# Patient Record
Sex: FEMALE | Employment: OTHER | URBAN - METROPOLITAN AREA
[De-identification: names, ages, dates, MRNs, and addresses within clinical notes are randomized per-mention and may not be internally consistent; named-entity substitution may affect disease eponyms.]

---

## 2019-03-05 ENCOUNTER — APPOINTMENT (OUTPATIENT)
Dept: CT IMAGING | Facility: HOSPITAL | Age: 84
DRG: 378 | End: 2019-03-05
Attending: EMERGENCY MEDICINE
Payer: MEDICARE

## 2019-03-05 ENCOUNTER — APPOINTMENT (OUTPATIENT)
Dept: GENERAL RADIOLOGY | Facility: HOSPITAL | Age: 84
DRG: 378 | End: 2019-03-05
Attending: EMERGENCY MEDICINE
Payer: MEDICARE

## 2019-03-05 ENCOUNTER — HOSPITAL ENCOUNTER (INPATIENT)
Facility: HOSPITAL | Age: 84
LOS: 2 days | Discharge: NURSING, REHAB OR LONG-TERM CARE | DRG: 378 | End: 2019-03-07
Attending: EMERGENCY MEDICINE | Admitting: HOSPITALIST
Payer: MEDICARE

## 2019-03-05 DIAGNOSIS — R25.1 EPISODE OF SHAKING: Primary | ICD-10-CM

## 2019-03-05 DIAGNOSIS — D64.9 ANEMIA, UNSPECIFIED TYPE: ICD-10-CM

## 2019-03-05 DIAGNOSIS — K92.2 GASTROINTESTINAL HEMORRHAGE, UNSPECIFIED GASTROINTESTINAL HEMORRHAGE TYPE: ICD-10-CM

## 2019-03-05 LAB
ALBUMIN SERPL-MCNC: 2.6 G/DL (ref 3.4–5)
ALBUMIN/GLOB SERPL: 0.9 {RATIO} (ref 1–2)
ALP LIVER SERPL-CCNC: 54 U/L (ref 55–142)
ALT SERPL-CCNC: 7 U/L (ref 13–56)
ANION GAP SERPL CALC-SCNC: 8 MMOL/L (ref 0–18)
ANTIBODY SCREEN: POSITIVE
APTT PPP: 21.2 SECONDS (ref 26.1–34.6)
AST SERPL-CCNC: 8 U/L (ref 15–37)
ATRIAL RATE: 111 BPM
BASOPHILS # BLD AUTO: 0.02 X10(3) UL (ref 0–0.2)
BASOPHILS NFR BLD AUTO: 0.1 %
BILIRUB SERPL-MCNC: 0.3 MG/DL (ref 0.1–2)
BUN BLD-MCNC: 25 MG/DL (ref 7–18)
BUN/CREAT SERPL: 34.2 (ref 10–20)
CALCIUM BLD-MCNC: 8.4 MG/DL (ref 8.5–10.1)
CHLORIDE SERPL-SCNC: 112 MMOL/L (ref 98–107)
CO2 SERPL-SCNC: 22 MMOL/L (ref 21–32)
CREAT BLD-MCNC: 0.73 MG/DL (ref 0.55–1.02)
DEPRECATED RDW RBC AUTO: 46.8 FL (ref 35.1–46.3)
DEPRECATED RDW RBC AUTO: 48.5 FL (ref 35.1–46.3)
E ANTIGEN: NEGATIVE
EOSINOPHIL # BLD AUTO: 0.03 X10(3) UL (ref 0–0.7)
EOSINOPHIL NFR BLD AUTO: 0.2 %
ERYTHROCYTE [DISTWIDTH] IN BLOOD BY AUTOMATED COUNT: 15 % (ref 11–15)
ERYTHROCYTE [DISTWIDTH] IN BLOOD BY AUTOMATED COUNT: 15.2 % (ref 11–15)
GLOBULIN PLAS-MCNC: 3 G/DL (ref 2.8–4.4)
GLUCOSE BLD-MCNC: 121 MG/DL (ref 70–99)
HCT VFR BLD AUTO: 14.3 % (ref 35–48)
HCT VFR BLD AUTO: 16 % (ref 35–48)
HGB BLD-MCNC: 4.5 G/DL (ref 12–16)
HGB BLD-MCNC: 4.9 G/DL (ref 12–16)
IMM GRANULOCYTES # BLD AUTO: 0.09 X10(3) UL (ref 0–1)
IMM GRANULOCYTES NFR BLD: 0.6 %
INR BLD: 1.02 (ref 0.9–1.1)
LYMPHOCYTES # BLD AUTO: 2.41 X10(3) UL (ref 1–4)
LYMPHOCYTES NFR BLD AUTO: 15.9 %
M PROTEIN MFR SERPL ELPH: 5.6 G/DL (ref 6.4–8.2)
MCH RBC QN AUTO: 26.9 PG (ref 26–34)
MCH RBC QN AUTO: 27.3 PG (ref 26–34)
MCHC RBC AUTO-ENTMCNC: 30.6 G/DL (ref 31–37)
MCHC RBC AUTO-ENTMCNC: 31.5 G/DL (ref 31–37)
MCV RBC AUTO: 86.7 FL (ref 80–100)
MCV RBC AUTO: 87.9 FL (ref 80–100)
MONOCYTES # BLD AUTO: 1.17 X10(3) UL (ref 0.1–1)
MONOCYTES NFR BLD AUTO: 7.7 %
NEUTROPHILS # BLD AUTO: 11.43 X10 (3) UL (ref 1.5–7.7)
NEUTROPHILS # BLD AUTO: 11.43 X10(3) UL (ref 1.5–7.7)
NEUTROPHILS NFR BLD AUTO: 75.5 %
OSMOLALITY SERPL CALC.SUM OF ELEC: 300 MOSM/KG (ref 275–295)
P AXIS: 51 DEGREES
P-R INTERVAL: 150 MS
PLATELET # BLD AUTO: 634 10(3)UL (ref 150–450)
PLATELET # BLD AUTO: 706 10(3)UL (ref 150–450)
POTASSIUM SERPL-SCNC: 4.3 MMOL/L (ref 3.5–5.1)
PSA SERPL DL<=0.01 NG/ML-MCNC: 13.8 SECONDS (ref 12.5–14.7)
Q-T INTERVAL: 328 MS
QRS DURATION: 70 MS
QTC CALCULATION (BEZET): 443 MS
R AXIS: 14 DEGREES
RBC # BLD AUTO: 1.65 X10(6)UL (ref 3.8–5.3)
RBC # BLD AUTO: 1.82 X10(6)UL (ref 3.8–5.3)
RH BLOOD TYPE: NEGATIVE
SODIUM SERPL-SCNC: 142 MMOL/L (ref 136–145)
T AXIS: 189 DEGREES
TROPONIN I SERPL-MCNC: <0.045 NG/ML (ref ?–0.04)
VENTRICULAR RATE: 110 BPM
WBC # BLD AUTO: 12.6 X10(3) UL (ref 4–11)
WBC # BLD AUTO: 15.2 X10(3) UL (ref 4–11)

## 2019-03-05 PROCEDURE — 99223 1ST HOSP IP/OBS HIGH 75: CPT | Performed by: HOSPITALIST

## 2019-03-05 PROCEDURE — 71045 X-RAY EXAM CHEST 1 VIEW: CPT | Performed by: EMERGENCY MEDICINE

## 2019-03-05 PROCEDURE — 30233N1 TRANSFUSION OF NONAUTOLOGOUS RED BLOOD CELLS INTO PERIPHERAL VEIN, PERCUTANEOUS APPROACH: ICD-10-PCS | Performed by: HOSPITALIST

## 2019-03-05 PROCEDURE — 70450 CT HEAD/BRAIN W/O DYE: CPT | Performed by: EMERGENCY MEDICINE

## 2019-03-05 RX ORDER — FAMOTIDINE 20 MG/1
20 TABLET ORAL 2 TIMES DAILY
Status: DISCONTINUED | OUTPATIENT
Start: 2019-03-05 | End: 2019-03-05

## 2019-03-05 RX ORDER — TRAMADOL HYDROCHLORIDE 50 MG/1
50 TABLET ORAL EVERY 4 HOURS PRN
Status: DISCONTINUED | OUTPATIENT
Start: 2019-03-05 | End: 2019-03-07

## 2019-03-05 RX ORDER — METOCLOPRAMIDE HYDROCHLORIDE 5 MG/ML
10 INJECTION INTRAMUSCULAR; INTRAVENOUS EVERY 8 HOURS PRN
Status: DISCONTINUED | OUTPATIENT
Start: 2019-03-05 | End: 2019-03-07

## 2019-03-05 RX ORDER — FAMOTIDINE 20 MG/1
20 TABLET ORAL 2 TIMES DAILY
COMMUNITY

## 2019-03-05 RX ORDER — ONDANSETRON 2 MG/ML
4 INJECTION INTRAMUSCULAR; INTRAVENOUS EVERY 6 HOURS PRN
Status: DISCONTINUED | OUTPATIENT
Start: 2019-03-05 | End: 2019-03-07

## 2019-03-05 RX ORDER — SODIUM CHLORIDE 9 MG/ML
INJECTION, SOLUTION INTRAVENOUS ONCE
Status: COMPLETED | OUTPATIENT
Start: 2019-03-05 | End: 2019-03-05

## 2019-03-05 RX ORDER — ZOLPIDEM TARTRATE 5 MG/1
5 TABLET ORAL NIGHTLY PRN
Status: DISCONTINUED | OUTPATIENT
Start: 2019-03-05 | End: 2019-03-07

## 2019-03-05 RX ORDER — ONDANSETRON 2 MG/ML
4 INJECTION INTRAMUSCULAR; INTRAVENOUS EVERY 4 HOURS PRN
Status: DISCONTINUED | OUTPATIENT
Start: 2019-03-05 | End: 2019-03-05 | Stop reason: DRUGHIGH

## 2019-03-05 RX ORDER — ACETAMINOPHEN 325 MG/1
650 TABLET ORAL EVERY 6 HOURS PRN
Status: DISCONTINUED | OUTPATIENT
Start: 2019-03-05 | End: 2019-03-07

## 2019-03-05 RX ORDER — ONDANSETRON 2 MG/ML
4 INJECTION INTRAMUSCULAR; INTRAVENOUS EVERY 4 HOURS PRN
Status: DISCONTINUED | OUTPATIENT
Start: 2019-03-05 | End: 2019-03-05

## 2019-03-05 RX ORDER — SODIUM CHLORIDE 9 MG/ML
INJECTION, SOLUTION INTRAVENOUS CONTINUOUS
Status: DISCONTINUED | OUTPATIENT
Start: 2019-03-05 | End: 2019-03-07

## 2019-03-05 NOTE — ED PROVIDER NOTES
Patient Seen in: [facility] Emergency Department    History   Patient presents with:  Seizure Disorder (neurologic)    Stated Complaint: seizure    HPI    Patient is an 61-year-old female nursing home resident, with multiple past medical problems, p Exam    Vital signs noted. GENERAL: Patient is awake and alert, supine on the cart, in no apparent distress. HEENT: Head is without evidence of trauma. Extraocular muscles are intact. Pupils are equal and reactive to light.  Oropharynx is pink and moist limits   TROPONIN I - Normal   PROTHROMBIN TIME (PT) - Normal   CBC WITH DIFFERENTIAL WITH PLATELET    Narrative: The following orders were created for panel order CBC WITH DIFFERENTIAL WITH PLATELET.   Procedure                               Abnormalit \"shakin\" fo 1-2 minutes. Patient denies any complaints. History of CVA and Parkinsons. No seizure history. FINDINGS:  There is cerebral atrophy with symmetric prominence of the ventricles.  There are patchy areas of low attenuation in the periventric 1 unit of packed red blood source was ordered for transfusion. Admission for further evaluation was recommended. Patient agrees. Patient will be admitted to the service of the [facility]. Dr. Harrell Schaumann notified.   Gastroenterology to follow in

## 2019-03-05 NOTE — ED INITIAL ASSESSMENT (HPI)
Pt from [facility] here after she was eating lunch and someone witnessed her \"shaking\" for about 1-2 minutes. No seizure history.   History of parkinsons and CVA

## 2019-03-05 NOTE — H&P
[facility]PITAL[facility]  History and Physical     Marlon Lay Patient Status:  Emergency    1935 MRN [id number]   Location [address] Attending No att. providers found   Hosp Day # 0 PCP No primary care provider on file. differently: Take 50 mg by mouth every 4 (four) hours as needed.  ) Disp: 10 tablet Rfl: 0   Zolpidem Tartrate 5 MG Oral Tab Take 5 mg by mouth nightly as needed for Sleep.  Disp:  Rfl:    carbidopa-levodopa  MG Oral Tab Take 1.5 tablets by mouth 3 (t Lab  03/05/19   1503   PTP  13.8   INR  1.02       Recent Labs   Lab  03/05/19   1503   TROP  <0.045       Imaging: Imaging data reviewed in Epic. ASSESSMENT / PLAN:     1. Acute GI bleed  2. Acute blood loss anemia  3. Leukocytosis - reactive?   4.

## 2019-03-05 NOTE — ED NOTES
Pt presents with L side contractures. Her neck also appears contracted. She is alert and talking to staff,  Appears oriented in conversation. Able to answer questions regarding meds and daily activities. Does not appear in post ictal state.

## 2019-03-06 LAB
ANION GAP SERPL CALC-SCNC: 7 MMOL/L (ref 0–18)
BASOPHILS # BLD AUTO: 0.04 X10(3) UL (ref 0–0.2)
BASOPHILS NFR BLD AUTO: 0.4 %
BUN BLD-MCNC: 13 MG/DL (ref 7–18)
BUN/CREAT SERPL: 19.7 (ref 10–20)
CALCIUM BLD-MCNC: 7.5 MG/DL (ref 8.5–10.1)
CHLORIDE SERPL-SCNC: 119 MMOL/L (ref 98–107)
CO2 SERPL-SCNC: 20 MMOL/L (ref 21–32)
CREAT BLD-MCNC: 0.66 MG/DL (ref 0.55–1.02)
DEPRECATED HBV CORE AB SER IA-ACNC: 5.2 NG/ML (ref 18–340)
DEPRECATED RDW RBC AUTO: 45.5 FL (ref 35.1–46.3)
EOSINOPHIL # BLD AUTO: 0.1 X10(3) UL (ref 0–0.7)
EOSINOPHIL NFR BLD AUTO: 1 %
ERYTHROCYTE [DISTWIDTH] IN BLOOD BY AUTOMATED COUNT: 14.3 % (ref 11–15)
GLUCOSE BLD-MCNC: 86 MG/DL (ref 70–99)
HCT VFR BLD AUTO: 24.5 % (ref 35–48)
HGB BLD-MCNC: 5.9 G/DL (ref 12–16)
HGB BLD-MCNC: 7.6 G/DL (ref 12–16)
HGB BLD-MCNC: 7.7 G/DL (ref 12–16)
HGB BLD-MCNC: 8.1 G/DL (ref 12–16)
IMM GRANULOCYTES # BLD AUTO: 0.06 X10(3) UL (ref 0–1)
IMM GRANULOCYTES NFR BLD: 0.6 %
IRON SATURATION: 5 % (ref 15–50)
IRON SERPL-MCNC: 18 UG/DL (ref 50–170)
LYMPHOCYTES # BLD AUTO: 2.27 X10(3) UL (ref 1–4)
LYMPHOCYTES NFR BLD AUTO: 21.6 %
MCH RBC QN AUTO: 27.5 PG (ref 26–34)
MCHC RBC AUTO-ENTMCNC: 31.4 G/DL (ref 31–37)
MCV RBC AUTO: 87.5 FL (ref 80–100)
MONOCYTES # BLD AUTO: 1.14 X10(3) UL (ref 0.1–1)
MONOCYTES NFR BLD AUTO: 10.9 %
NEUTROPHILS # BLD AUTO: 6.88 X10 (3) UL (ref 1.5–7.7)
NEUTROPHILS # BLD AUTO: 6.88 X10(3) UL (ref 1.5–7.7)
NEUTROPHILS NFR BLD AUTO: 65.5 %
OSMOLALITY SERPL CALC.SUM OF ELEC: 301 MOSM/KG (ref 275–295)
PLATELET # BLD AUTO: 486 10(3)UL (ref 150–450)
POTASSIUM SERPL-SCNC: 3.7 MMOL/L (ref 3.5–5.1)
RBC # BLD AUTO: 2.8 X10(6)UL (ref 3.8–5.3)
SODIUM SERPL-SCNC: 146 MMOL/L (ref 136–145)
TOTAL IRON BINDING CAPACITY: 383 UG/DL (ref 240–450)
TRANSFERRIN SERPL-MCNC: 257 MG/DL (ref 200–360)
TRANSFERRIN SERPL-MCNC: 257 MG/DL (ref 200–360)
VIT B12 SERPL-MCNC: 495 PG/ML (ref 193–986)
WBC # BLD AUTO: 10.5 X10(3) UL (ref 4–11)

## 2019-03-06 PROCEDURE — 0DB48ZX EXCISION OF ESOPHAGOGASTRIC JUNCTION, VIA NATURAL OR ARTIFICIAL OPENING ENDOSCOPIC, DIAGNOSTIC: ICD-10-PCS | Performed by: INTERNAL MEDICINE

## 2019-03-06 PROCEDURE — 99232 SBSQ HOSP IP/OBS MODERATE 35: CPT | Performed by: INTERNAL MEDICINE

## 2019-03-06 RX ORDER — SODIUM CHLORIDE, SODIUM LACTATE, POTASSIUM CHLORIDE, CALCIUM CHLORIDE 600; 310; 30; 20 MG/100ML; MG/100ML; MG/100ML; MG/100ML
INJECTION, SOLUTION INTRAVENOUS CONTINUOUS
Status: CANCELLED | OUTPATIENT
Start: 2019-03-06

## 2019-03-06 RX ORDER — NALOXONE HYDROCHLORIDE 0.4 MG/ML
80 INJECTION, SOLUTION INTRAMUSCULAR; INTRAVENOUS; SUBCUTANEOUS AS NEEDED
Status: CANCELLED | OUTPATIENT
Start: 2019-03-06 | End: 2019-03-06

## 2019-03-06 RX ORDER — POTASSIUM CHLORIDE 14.9 MG/ML
20 INJECTION INTRAVENOUS ONCE
Status: COMPLETED | OUTPATIENT
Start: 2019-03-06 | End: 2019-03-06

## 2019-03-06 NOTE — PLAN OF CARE
Assumed care at 1900  Patient oriented to room  Safety precautions in place  Admission navigator completed  NH updated  Son called to get telephone consent for blood administration, updated on POC - stated will be here in afternoon 3/6    AOx4, periods of

## 2019-03-06 NOTE — PROGRESS NOTES
Pharmacy note: Duplicate Proton Pump Inhibitor with Histamine 2 blocker. Eugenie Yuan is a 82year old female who has been prescribed both Famotidine and Protonix.   Pepcid was discontinued per P&T approved protocol for duplicate therapy in adult pat

## 2019-03-06 NOTE — CONSULTS
[facility]                       Gastroenterology [address] [facility] Gastroenterology    Maeve Dela Cruz Patient Status:  Inpatient    1935 MRN [id number]   [facility] 7NE-A Attending Mitzi Alvarez MD   Hosp Day # 1 PCP No pr (AMBIEN) tab 5 mg 5 mg Oral Nightly PRN   0.9%  NaCl infusion  Intravenous Continuous   acetaminophen (TYLENOL) tab 650 mg 650 mg Oral Q6H PRN   ondansetron HCl (ZOFRAN) injection 4 mg 4 mg Intravenous Q6H PRN   Metoclopramide HCl (REGLAN) injection 10 mg 16   Ht 5' 4\" (1.626 m)   Wt 102 lb 1.2 oz (46.3 kg)   SpO2 94%   BMI 17.52 kg/m²   Gen: Awake and alert to self/place, able to speak in complete sentences  HENT: EOMI, PERRL, oropharynx is clear with dry mucosal membranes  Eyes: Sclerae are anicteric  Ne reduction techniques were used.  Dose information is transmitted to the [facility] ([address] [place] [address] of Radiology) [address]) which includes the Dose Index   Registry.     PATIENT STATED HISTORY: (As transcribed by Technologist) Bony Fontenot c/b contractures and wheelchair bound admitted yesterday with tremors, found to have Hgb 4.5 (MCV 87) from 12.7 on 10/2016 and melenic stool on rectal exam. BUN elevated on arrival (25) with normal creat.  On exam, abd is soft, non-tender, non-distended, BS

## 2019-03-06 NOTE — OPERATIVE REPORT
Operative Report-Esophagogastroduodenoscopy with cold biopsies  Sherice Carter 4/27/1935   [facility] [id number] MRN [id number]   Admission Date 3/5/2019 Operation Date 3/6/2019   Attending Physician Devaughn Cornell MD Operating Physician Jagjit Pacheco DO Consider repeat EGD in 8 weeks (will have to discuss with her son).    CC Report:     Jeri Gomez  3/6/2019  2:29 PM

## 2019-03-06 NOTE — CM/SW NOTE
Pt is an 83 yo  female admitted for episodes of shaking. Pt came from [address] [place] [address] where she has lived for 2 1/2 years. Pt has two sons - Merline Peach who is pt's [de-identified] and financial POA as well and Lillian Boucher who lives out of state.   Pt

## 2019-03-06 NOTE — PHYSICAL THERAPY NOTE
PT consult received. Pt is a resident of [place], W/[facility] and has contractures. Pt is at her functional baseline. Pt does not require skilled IP physical therapy services. Therefore, will sign off. Discussed with RN.

## 2019-03-06 NOTE — PROGRESS NOTES
[facility]  Progress Note     Cee Sy Patient Status:  Inpatient    1935 MRN [id number]   [facility] 7NE-A Attending Yulisa Mccartney MD   Hosp Day # 1 PCP No primary care provider on file.      Chief Complaint: right shelbie --        Estimated Creatinine Clearance: 48.9 mL/min (based on SCr of 0.66 mg/dL). Recent Labs   Lab  03/05/19   1503   PTP  13.8   INR  1.02       Recent Labs   Lab  03/05/19   1503   TROP  <0.045            Imaging: Imaging data reviewed in Epic.

## 2019-03-06 NOTE — ED NOTES
Report given to [facility], [address] Patient updated with plan of care, no questions at this time.

## 2019-03-07 VITALS
DIASTOLIC BLOOD PRESSURE: 57 MMHG | HEIGHT: 64 IN | OXYGEN SATURATION: 97 % | WEIGHT: 102.06 LBS | BODY MASS INDEX: 17.42 KG/M2 | HEART RATE: 79 BPM | SYSTOLIC BLOOD PRESSURE: 136 MMHG | TEMPERATURE: 98 F | RESPIRATION RATE: 18 BRPM

## 2019-03-07 LAB
ANION GAP SERPL CALC-SCNC: 9 MMOL/L (ref 0–18)
BASOPHILS # BLD AUTO: 0.06 X10(3) UL (ref 0–0.2)
BASOPHILS NFR BLD AUTO: 0.6 %
BLOOD TYPE BARCODE: 600
BLOOD TYPE BARCODE: 600
BUN BLD-MCNC: 9 MG/DL (ref 7–18)
BUN/CREAT SERPL: 13.6 (ref 10–20)
CALCIUM BLD-MCNC: 7.8 MG/DL (ref 8.5–10.1)
CHLORIDE SERPL-SCNC: 116 MMOL/L (ref 98–107)
CO2 SERPL-SCNC: 18 MMOL/L (ref 21–32)
CREAT BLD-MCNC: 0.66 MG/DL (ref 0.55–1.02)
DEPRECATED RDW RBC AUTO: 48.6 FL (ref 35.1–46.3)
EOSINOPHIL # BLD AUTO: 0.34 X10(3) UL (ref 0–0.7)
EOSINOPHIL NFR BLD AUTO: 3.1 %
ERYTHROCYTE [DISTWIDTH] IN BLOOD BY AUTOMATED COUNT: 15.4 % (ref 11–15)
GLUCOSE BLD-MCNC: 80 MG/DL (ref 70–99)
HAV IGM SER QL: 2.1 MG/DL (ref 1.6–2.6)
HCT VFR BLD AUTO: 25.7 % (ref 35–48)
HGB BLD-MCNC: 8.1 G/DL (ref 12–16)
HGB BLD-MCNC: 8.1 G/DL (ref 12–16)
IMM GRANULOCYTES # BLD AUTO: 0.08 X10(3) UL (ref 0–1)
IMM GRANULOCYTES NFR BLD: 0.7 %
LYMPHOCYTES # BLD AUTO: 1.92 X10(3) UL (ref 1–4)
LYMPHOCYTES NFR BLD AUTO: 17.7 %
MCH RBC QN AUTO: 27.5 PG (ref 26–34)
MCHC RBC AUTO-ENTMCNC: 31.5 G/DL (ref 31–37)
MCV RBC AUTO: 87.1 FL (ref 80–100)
MONOCYTES # BLD AUTO: 1.07 X10(3) UL (ref 0.1–1)
MONOCYTES NFR BLD AUTO: 9.9 %
NEUTROPHILS # BLD AUTO: 7.35 X10 (3) UL (ref 1.5–7.7)
NEUTROPHILS # BLD AUTO: 7.35 X10(3) UL (ref 1.5–7.7)
NEUTROPHILS NFR BLD AUTO: 68 %
OSMOLALITY SERPL CALC.SUM OF ELEC: 294 MOSM/KG (ref 275–295)
PLATELET # BLD AUTO: 476 10(3)UL (ref 150–450)
POTASSIUM SERPL-SCNC: 3.7 MMOL/L (ref 3.5–5.1)
RBC # BLD AUTO: 2.95 X10(6)UL (ref 3.8–5.3)
SODIUM SERPL-SCNC: 143 MMOL/L (ref 136–145)
WBC # BLD AUTO: 10.8 X10(3) UL (ref 4–11)

## 2019-03-07 PROCEDURE — 99239 HOSP IP/OBS DSCHRG MGMT >30: CPT | Performed by: HOSPITALIST

## 2019-03-07 RX ORDER — POTASSIUM CHLORIDE 14.9 MG/ML
20 INJECTION INTRAVENOUS ONCE
Status: COMPLETED | OUTPATIENT
Start: 2019-03-07 | End: 2019-03-07

## 2019-03-07 RX ORDER — PANTOPRAZOLE SODIUM 40 MG/1
40 TABLET, DELAYED RELEASE ORAL
Qty: 60 TABLET | Refills: 1 | Status: SHIPPED | OUTPATIENT
Start: 2019-03-07

## 2019-03-07 NOTE — PLAN OF CARE
AOx4, periods of forgetfulness  BUE tremors noted occasionally, R>L  Patient pale - bruising to BUE  Abdomen soft, nontender  +BM overnight - black  Briefed, incontinent  Patient's bilateral legs and L arm contracted, R arm partially contracted  Turned kenneth

## 2019-03-07 NOTE — PROGRESS NOTES
Gastroenterology Progress Note  Patient Name: Jarad Chin  Chief Complaint: esophagitis  S: Pt with 1 dark BM overnight but Hgb is stable and then she had a brown BM today per nursing. She denies abdominal pain.    O: /56 (BP Location: Left arm) BID-->transition to PO BID x 8 weeks as outpatient  4. Will discuss whether or not pt should have repeat EGD in 8 weeks in clinic  5. Avoid NSAIDs  6. CBC in 1 week      Total time spent with patient and coordinating care: 26 minutes.      Leandrew Rinne,

## 2019-03-07 NOTE — PLAN OF CARE
NURSING DISCHARGE NOTE    Discharged Nursing home via Ambulance. Accompanied by Support staff  Belongings Taken by patient/family.     Received pt a/ox4, forgetfulness, RA, NSR at 0700  BUE tremors  BLE and L arm contracted, R arm partially contracted

## 2019-03-07 NOTE — PLAN OF CARE
Assumed care at 0700  Patient A/Ox4. VSS, NSR per tele, on RA. Abdomen, soft, not tender. Briefed, incontinent. Dark bowel movement this evening  Patient's bilateral legs and L arm contracted, R arm partially contracted.   BUE tremors occasionally, Rt >

## 2019-03-07 NOTE — CM/SW NOTE
Pt is ready for d/c today. Pt will return to [facility]. Family said pt will need transportation back to [facility]. Pt is contracted and will need an ambulance. Arranged [facility] to  pt at 5:30pm today.

## 2019-03-07 NOTE — PROGRESS NOTES
[facility]  Progress Note     Tivis Dy Patient Status:  Inpatient    1935 MRN [id number]   [facility] 7NE-A Attending Chica Roblero MD   Hosp Day # 2 PCP No primary care provider on file.      Chief Complaint: right shelbie 119*  116*   CO2  22.0  20.0*  18.0*   ALKPHO  54*   --    --    AST  8*   --    --    ALT  7*   --    --    BILT  0.3   --    --    TP  5.6*   --    --        Estimated Creatinine Clearance: 48.9 mL/min (based on SCr of 0.66 mg/dL).     Recent Labs   Lab

## 2019-03-07 NOTE — PAYOR COMM NOTE
--------------  ADMISSION REVIEW     Payor: MEDICARE ADVANTAGE PROGRAM (GENERAL)  Subscriber #:  [id number]  Authorization Number:  [id number]    Admit date: 3/5/19  Admit time: 1852       Admitting Physician: Migue Hearn MD  Attending Physician Drug use: No      Review of Systems    Positive for stated complaint: seizure  Other systems are as noted in HPI. Constitutional and vital signs reviewed. All other systems reviewed and negative except as noted above.     Physical Exam     ED Triage PTT 21.2 (*)     All other components within normal limits   CBC, PLATELET; NO DIFFERENTIAL - Abnormal; Notable for the following components:    WBC 12.6 (*)     RBC 1.65 (*)     HGB 4.5 (*)     HCT 14.3 (*)     .0 (*)     RDW-SD 46.8 (*)     All o EKG:The EKG revealed rate, intervals, and axis as noted on the EKG report. I have reviewed and agree with these readings. Sinus tachycardia at 110 bpm.  No acute ST segment changes.        Ct Brain Or Head (44231)    Result Date: 3/5/2019  PROCEDURE:  CT B PROCEDURE:  XR CHEST AP PORTABLE  (CPT=71045)  TECHNIQUE:  AP chest radiograph was obtained. COMPARISON:  [facility] XR CHEST AP PORTABLE  (CPT=71010), 10/16/2016, 15:26.   INDICATIONS:  seizure  PATIENT STATED HISTORY: (As transcribed by Technologist)  Vanice Gaucher Gastrointestinal hemorrhage, unspecified gastrointestinal hemorrhage type    Disposition:  Admit  3/5/2019  4:56 pm    Follow-up:  No follow-up provider specified.       Medications Prescribed:  Current Discharge Medication List        Present on Admission No current facility-administered medications on file prior to encounter. Current Outpatient Medications on File Prior to Encounter:  famoTIDine 20 MG Oral Tab Take 20 mg by mouth 2 (two) times daily.  Disp:  Rfl:    Cholecalciferol (VITAMIN D3) 82688 UNIT Psychiatric: Appropriate mood and affect.       Diagnostic Data:      Labs:  Recent Labs   Lab  03/05/19   1503  03/05/19   1537   WBC  15.2*  12.6*   HGB  4.9*  4.5*   MCV  87.9  86.7   PLT  706.0*  634.0*   INR  1.02   --        Recent Labs   Lab  03/05/1 Pantoprazole Sodium (PROTONIX) 40 mg in Sodium Chloride 0.9 % 10 mL IV push     Date Action Dose Route User    3/7/2019 0933 Given 40 mg Intravenous Niya Sifuentes RN    3/6/2019 2137 Given 40 mg Intravenous Kenyatta Ford, RN      potassium chloride IVP Past Medical History:   Diagnosis Date   • Osteoarthritis     •      • Renal disorder     • RLS (restless legs syndrome)     • Stroke [facility])     • Visual impairment        PSHx: History reviewed. No pertinent surgical history.   Medications:   potassium Genitourinary:  The patient reports no history of recurrent urinary tract infections, hematuria, dysuria, or nephrolithiasis           Psychiatric: The patient reports no history of depression, anxiety, suicidal ideation, or homicidal ideation BUN  25*  13   CREATSERUM  0.73  0.66   GFRAA  89  96   GFRNAA  77  83   CA  8.4*  7.5*   NA  142  146*   K  4.3  3.7   CL  112*  119*   CO2  22.0  20.0*          Recent Labs   Lab  03/06/19   0523   RBC  2.80*   HGB  7.7*   HCT  24.5*   MCV  87.5   [facility]  2 COMPARISON:  [facility] , XR CHEST AP PORTABLE  (CPT=71010), 10/16/2016, 15:26.     INDICATIONS:  seizure     PATIENT STATED HISTORY: (As transcribed by Technologist) Maureen Rodriguez provided no additional history at this time.      FINDINGS:  Patient is rotated and I personally saw and examined the patient, agree with the above findings, assessment and plan. The patient is well appearing and in NAD. She denies melena, BRBPR but per ER rectal exam was with melena. Her abdomen is soft, NT/ND, +BS.  She denies weight los PROCEDURE DESCRIPTION:  The patient was then brought to the endoscopy suite where her pulse, pulse oximetry and blood pressure were monitored. She was placed in the left lateral decubitus position and deep sedation was administered.  Once adequate sedation

## 2019-03-08 NOTE — CM/SW NOTE
03/08/19 0900   Discharge disposition   Expected discharge disposition Assisted Amy   Name of [facility] No   Discharge transportation Olga

## 2019-03-08 NOTE — DISCHARGE SUMMARY
[facility] HOSPITAL[facility]  DISCHARGE SUMMARY     Sherice Carter Patient Status:  Inpatient    1935 MRN [id number]   [facility] 7NE-A Attending No att. providers found   [facility] Day # 2 PCP No primary care provider on file.      Date of Admissio · none    Consultants:  • GI    Discharge Medication List:     Discharge Medications      START taking these medications      Instructions Prescription details   Pantoprazole Sodium 40 MG Tbec  Commonly known as:  PROTONIX      Take 1 tablet (40 mg total acute distress. Respiratory: Clear to auscultation bilaterally. No wheezes. No rhonchi. Cardiovascular: S1, S2. Regular rate and rhythm. No murmurs, rubs or gallops. Abdomen: Soft, nontender, nondistended. Positive bowel sounds.  No rebound or guardin

## 2019-03-11 LAB — RGTSCRN: 3

## 2019-03-11 NOTE — PAYOR COMM NOTE
--------------  DISCHARGE REVIEW    Payor: MEDICARE ADVANTAGE PROGRAM (GENERAL)  Subscriber #:  [id number]  Authorization Number:  [id number]    Admit date: 3/5/19  Admit time:  1852  Discharge Date: 3/7/2019  5:34 PM     Admitting Physician: Karina Sanders recommended to continue PPI twice daily and to avoid NSAIDs. Her shakiness was likely due to her Parkinson's. She did receive PRBCs with improvement of her hemoglobin as well as IV iron.   She was cleared for discharge and discharged home in stable condit your prescriptions at the location directed by your doctor or nurse    Bring a paper prescription for each of these medications  · Pantoprazole Sodium 40 MG Tbec         [place]: [place]    Follow-up appointment:   Jaziel Carrion [address] [place], [place] [address]

## (undated) DEVICE — 1200CC GUARDIAN II: Brand: GUARDIAN

## (undated) DEVICE — FLUIDGARD® 160 ANTI-FOG SURGICAL MASK WITH ANTI-GLARE SHIELD: Brand: PRECEPT ®

## (undated) DEVICE — FILTERLINE NASAL ADULT O2/CO2

## (undated) DEVICE — 3M™ RED DOT™ MONITORING ELECTRODE WITH FOAM TAPE AND STICKY GEL, 50/BAG, 20/CASE, 72/PLT 2570: Brand: RED DOT™

## (undated) DEVICE — FORCEP BIOPSY RJ4 LG CAP W/ND

## (undated) DEVICE — ENDOSCOPY PACK UPPER: Brand: MEDLINE INDUSTRIES, INC.

## (undated) DEVICE — Device: Brand: DEFENDO AIR/WATER/SUCTION AND BIOPSY VALVE

## (undated) NOTE — IP AVS SNAPSHOT
Patient Demographics     Address  [address] [place] [address] Phone  [phone] [facility])      Emergency Contact(s)     Name Relation Home Work [address] [place] [address] Son [phone]      Allergies as of 3/7/2019  Reviewed o Commonly known as:  ULTRAM      Take 1 tablet (50 mg total) by mouth every 12 (twelve) hours as needed. Isabell Nunez MD         Vitamin D3 09451 units Caps  Next dose due:  Back to regular scheduled dose      Take 50,000 Units by mouth every 7 days. HEMOGLOBIN [[id number]] (Abnormal)  Resulted: 03/07/19 1235, Result status: Final result   Ordering provider:  YOAN Gibbons  03/06/19 2300 Resulting lab:  [facility]    Specimen Information    Type Source Collected On   Blood — 03/07/19 1220 WITH PLATELET.   Procedure                               Abnormality         Status                     ---------                               -----------         ------                     CBC W/ DIFFERENTIAL[[id number]]          Abnormal            Final not recall the episode at all and has some baseline tremors from her parkinson's. Of note, pt does c/o having maroon colored stools for about a month now. Denies lightheadedness, dizziness, n/v, abd pain, cp, or sob. In ED, hgb found to be 4.5.      Past Me Pertinent positives and negatives noted in the HPI. Physical Exam:[RS.1]    /44   Pulse 101   Temp 98.8 °F (37.1 °C) (Tympanic)   Resp 21   Ht 162.6 cm (5' 4\")   Wt 100 lb (45.4 kg)   SpO2 99%   BMI 17.16 kg/m²[RS.2]   General: No acute distress. 4. Hemodynamically stable suggestive of a slow bleed which would be consistent with a month of bloody stools  5. IVF  5. Episode of shaking  1. Unclear story, may be related to parkinson's? 2. No signs of post ictal state  3.  Monitor for now, if some clin reports increased heartburn over the last several weeks. She reports a BM pattern which favors constipation, in ER pt was noted to have melenic stool on exam. Pt currently does not recall bloody stools. Home medications include a daily baby ASA and no[MG. 2 gastrointestinal malignancies; No family history of ulcer disease, or inflammatory bowel disease  ROS:  In addition to the pertinent positives described above:             Infectious Disease:  No chronic infections or recent fevers prior to the acute illne Ext: No peripheral edema[MG.1]; left arm contracture[MG. 2]   Skin: Warm and dry  Psychiatric: Appropriate mood and congruent affect without obvious depression or anxiety  Labs: Lab Results   Component Value Date    WBC 10.5 03/06/2019    HGB 7.7 03/06/2019 white matter which are nonspecific but most consistent with small vessel ischemic changes. There is no   evidence of hemorrhage, mass, midline shift, or extra-axial fluid collection.      The visualized paranasal sinuses show no significant sinus disease. with the patient and her son Maxine Montano via a telephone conversation. He expressed understanding of the risks and was agreeable to proceed. [MG.2]    Recommendations:[MG.1]     1. Plan for EGD under MAC with Dr De Friend today  2.  Clear liquid diet today; NPO afte Signed    :  Ramona Muñiz, PT (Physical Therapist)       PT consult received. Pt is a resident of [address], W/[facility] and has contractures. Pt is at her functional baseline. Pt does not require skilled IP physical therapy services.  Therefore, norma

## (undated) NOTE — LETTER
Date: 3/8/2019  Patient Name:  Joel Amos             Address: [place] [address] [place] [address]    : 1935    Dear Joel Amos,    The biopsies from your ulcer showed inflammatory tissue but were negative for a viral or fungal infection

## (undated) NOTE — IP AVS SNAPSHOT
[address]            (For Outpatient Use Only) Initial Admit Date: 3/5/2019   Inpt/Obs Admit Date: Inpt: 3/5/19 / Obs: N/A   Discharge Date:    Koleen Captain:  [de-identified]   MRN: [de-identified]   CSN: [id number]        ENCOUNTER  Patient C

## (undated) NOTE — LETTER
Bess Guajardo [address] 6 [address]  [place], [address] [place] [address]    Consent for Operation  Date: __03/06/19_________                                Time: ___13:22_______    1.  I authorize the performance upon Maeve Dela Cruz the following operation:  Procedu procedure has been videotaped, the surgeon will obtain the original videotape. The hospital will not be responsible for storage or maintenance of this tape.   7. For the purpose of advancing medical education, I consent to the admittance of observers to the STATEMENTS REQUIRING INSERTION OR COMPLETION WERE FILLED IN.     Signature of Patient:   ___________________________    When the patient is a minor or mentally incompetent to give consent:  Signature of person authorized to consent for patient: ____________ supplements, and pills I can buy without a prescription (including street drugs/illegal medications). Failure to inform my anesthesiologist about these medicines may increase my risk of anesthetic complications. iv.  If I am allergic to anything or have ha Anesthesiologist Signature     Date   Time  I have discussed the procedure and information above with the patient (or patient’s representative) and answered their questions. The patient or their representative has agreed to have anesthesia services.     ___